# Patient Record
Sex: FEMALE | Race: WHITE | NOT HISPANIC OR LATINO | Employment: OTHER | URBAN - METROPOLITAN AREA
[De-identification: names, ages, dates, MRNs, and addresses within clinical notes are randomized per-mention and may not be internally consistent; named-entity substitution may affect disease eponyms.]

---

## 2022-09-26 ENCOUNTER — TRANSFERRED RECORDS (OUTPATIENT)
Dept: HEALTH INFORMATION MANAGEMENT | Facility: CLINIC | Age: 62
End: 2022-09-26

## 2022-10-06 ENCOUNTER — TRANSFERRED RECORDS (OUTPATIENT)
Dept: HEALTH INFORMATION MANAGEMENT | Facility: CLINIC | Age: 62
End: 2022-10-06

## 2023-03-28 ENCOUNTER — TRANSCRIBE ORDERS (OUTPATIENT)
Dept: OTHER | Age: 63
End: 2023-03-28

## 2023-03-28 DIAGNOSIS — R20.2 NUMBNESS AND TINGLING: Primary | ICD-10-CM

## 2023-03-28 DIAGNOSIS — R20.0 NUMBNESS AND TINGLING: Primary | ICD-10-CM

## 2023-03-28 DIAGNOSIS — G62.9 PERIPHERAL NEUROPATHY: ICD-10-CM

## 2023-07-13 ENCOUNTER — TELEPHONE (OUTPATIENT)
Dept: NEUROLOGY | Facility: CLINIC | Age: 63
End: 2023-07-13
Payer: COMMERCIAL

## 2023-07-13 NOTE — TELEPHONE ENCOUNTER
LMTC- need to reschedule as provider will not be in clinic- Offer 10-5-23 as the date is on hold for rescheduling.  Also inform patient of physician upcoming [facility]. They may want to schedule with a different provider.

## 2023-11-03 ENCOUNTER — OFFICE VISIT (OUTPATIENT)
Dept: NEUROLOGY | Facility: CLINIC | Age: 63
End: 2023-11-03
Attending: ORTHOPAEDIC SURGERY
Payer: COMMERCIAL

## 2023-11-03 VITALS
HEART RATE: 73 BPM | OXYGEN SATURATION: 99 % | WEIGHT: 130 LBS | SYSTOLIC BLOOD PRESSURE: 150 MMHG | DIASTOLIC BLOOD PRESSURE: 81 MMHG

## 2023-11-03 DIAGNOSIS — G57.02 LESION OF SCIATIC NERVE, LEFT: Primary | ICD-10-CM

## 2023-11-03 DIAGNOSIS — R20.2 NUMBNESS AND TINGLING: ICD-10-CM

## 2023-11-03 DIAGNOSIS — R20.0 NUMBNESS AND TINGLING: ICD-10-CM

## 2023-11-03 PROCEDURE — 99204 OFFICE O/P NEW MOD 45 MIN: CPT | Performed by: PSYCHIATRY & NEUROLOGY

## 2023-11-03 RX ORDER — METHIMAZOLE 5 MG/1
5 TABLET ORAL
COMMUNITY
Start: 2000-06-01

## 2023-11-03 NOTE — PROGRESS NOTES
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  I would not be surprised if she was not having local irritation of the posterior tibial nerve at the knee related to her degenerative changes and mechanical disadvantage however it is also possible that she does have some mechanical issues in the lumbar spine causing some irritability of the sciatic nerve as well, either of these could produce her symptoms.  Absent any significant abnormality on her exam however leads me to believe that this is more of an irritability than the damage circumstance and need not be approached for any aggressive interventions.  After lengthy discussion with her in the course of our visit, it is suggested she simply wait this out to see if her knee replacement surgery does any good.  She may in the interim however focus a little bit more on her lumbar spine mechanics as noted above.             History of Present Illness:   CC:    I met with Basilia today for 55 minutes to address some neuropathic symptoms she is experiencing in her left foot.  She is a generally healthy 63-year-old who has had \"prediabetes\" identified several years ago with an elevated hemoglobin A1c to 5.9 and slightly elevated fasting sugars.  Diet and exercise however have reversed that tendency such that her numbers are now well within the normal limits.  She also is followed for Graves' disease.    She has orthopedic " issues involving her knees.  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  She has constant left knee discomfort worse with standing and moving but never resolving completely.  She also, has had issues with her lower back at one time having significant back pain rating into the left buttock and thigh but this has not been a problem of more recent vintage.  She has not been aware of any prior neurological issues involving any neuropathic symptoms such as other forms of sciatica, any weakness numbness or tingling anywhere in the lower extremities, difficulty with gait, balance, coordination, or bowel or bladder function other than the mechanical issues provided by her knee discomfort.  She has had however some numbness and tingling in the bottom of the left foot for several months perhaps up to a year, has undergone EMG testing of the left leg (results not available to me) and reports that they were negative for neuropathic findings.  Whether her left leg discomfort has emerged from her back or her knee has been up for debate for several years however it is quite clear that she has rather substantial degenerative knee issues and that she will need this knee replaced shortly.    The balance of her neurologic review of systems is noncontributory.  She has no issues with headache, vertigo, diplopia, dysarthria, dysphagia, focal weakness, other areas of numbness, loss of consciousness, or seizures.    The balance of her health history is noted below.             Physical Exam:   Other than absent reflexes, her neurological examination is normal including review of her cranial nerves, motor, sensory, coordination station gait and reflexes.  She does have mechanical abnormalities in her lumbar spine with an absence of normal flattening of the lumbar lordotic curve more  noticeable on the left than on the right with flattening on the left and some rounding of the curvature of the right paraspinous muscles in the lumbar region.  Forward flexion at the waist however does not provoke any neuropathic symptoms nor can I provoke them with pressure over the SI joint or sciatic notch.  Her gait is rather remarkable for profound genu varum bilaterally.         Data:   All laboratory data reviewed  All imaging studies reviewed by me             DATA for DOCUMENTATION:         Past Medical History:   There is no problem list on file for this patient.    No past medical history on file.    Also see scanned health assessment forms.       Past Surgical History:   No past surgical history on file.         Social History:     Social History     Socioeconomic History    Marital status: Single     Spouse name: Not on file    Number of children: Not on file    Years of education: Not on file    Highest education level: Not on file   Occupational History    Not on file   Tobacco Use    Smoking status: Never    Smokeless tobacco: Never   Substance and Sexual Activity    Alcohol use: Yes     Comment: Moderate    Drug use: Never    Sexual activity: Not on file   Other Topics Concern    Not on file   Social History Narrative    Not on file     Social Determinants of Health     Financial Resource Strain: Not on file   Food Insecurity: Not on file   Transportation Needs: Not on file   Physical Activity: Not on file   Stress: Not on file   Social Connections: Not on file   Interpersonal Safety: Not on file   Housing Stability: Not on file              Family History:   No family history on file.         Medications:     Current Outpatient Medications   Medication Sig    methimazole (TAPAZOLE) 5 MG tablet Take 5 mg by mouth     No current facility-administered medications for this visit.              Review of Systems:   A comprehensive 10 point review of systems (constitutional, ENT, cardiac, peripheral  vascular, lymphatic, respiratory, GI, , Musculoskeletal, skin, Neurological) was performed and found to be negative except as described in this note.     See intake form completed by patient

## 2023-11-03 NOTE — NURSING NOTE
Debra Knight is a 63 year old female who presents for:  Chief Complaint   Patient presents with    Referral     Numbness and tingling  Peripheral neuropathy            Initial Vitals:  BP (!) 150/81   Pulse 73   Wt 59 kg (130 lb)   SpO2 99%  There is no height or weight on file to calculate BMI.. There is no height or weight on file to calculate BSA. BP completed using cuff size: linda Nina

## 2023-11-03 NOTE — LETTER
"    11/3/2023         RE: Debra Knight  [address]  [place] [facility] [address]        Dear Colleague,    Thank you for referring your patient, Debra Knight, to the [facility]. Please see a copy of my visit note below.        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  I would not be surprised if she was not having local irritation of the posterior tibial nerve at the knee related to her degenerative changes and mechanical disadvantage however it is also possible that she does have some mechanical issues in the lumbar spine causing some irritability of the sciatic nerve as well, either of these could produce her symptoms.  Absent any significant abnormality on her exam however leads me to believe that this is more of an irritability than the damage circumstance and need not be approached for any aggressive interventions.  After lengthy discussion with her in the course of our visit, it is suggested she simply wait this out to see if her knee replacement surgery does any good.  She may in the interim however focus a little bit more on her lumbar spine mechanics as noted above.             History of Present Illness:   CC:    I met with Basilia today for 55 minutes to address some neuropathic symptoms she is experiencing in her left foot.  She is a generally healthy 63-year-old who has had \"prediabetes\" identified several " years ago with an elevated hemoglobin A1c to 5.9 and slightly elevated fasting sugars.  Diet and exercise however have reversed that tendency such that her numbers are now well within the normal limits.  She also is followed for Graves' disease.    She has orthopedic issues involving her knees.  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  She has constant left knee discomfort worse with standing and moving but never resolving completely.  She also, has had issues with her lower back at one time having significant back pain rating into the left buttock and thigh but this has not been a problem of more recent vintage.  She has not been aware of any prior neurological issues involving any neuropathic symptoms such as other forms of sciatica, any weakness numbness or tingling anywhere in the lower extremities, difficulty with gait, balance, coordination, or bowel or bladder function other than the mechanical issues provided by her knee discomfort.  She has had however some numbness and tingling in the bottom of the left foot for several months perhaps up to a year, has undergone EMG testing of the left leg (results not available to me) and reports that they were negative for neuropathic findings.  Whether her left leg discomfort has emerged from her back or her knee has been up for debate for several years however it is quite clear that she has rather substantial degenerative knee issues and that she will need this knee replaced shortly.    The balance of her neurologic review of systems is noncontributory.  She has no issues with headache, vertigo, diplopia, dysarthria, dysphagia, focal weakness, other areas of numbness, loss of consciousness, or seizures.    The balance of her health history is noted below.             Physical Exam:   Other than absent reflexes,  her neurological examination is normal including review of her cranial nerves, motor, sensory, coordination station gait and reflexes.  She does have mechanical abnormalities in her lumbar spine with an absence of normal flattening of the lumbar lordotic curve more noticeable on the left than on the right with flattening on the left and some rounding of the curvature of the right paraspinous muscles in the lumbar region.  Forward flexion at the waist however does not provoke any neuropathic symptoms nor can I provoke them with pressure over the SI joint or sciatic notch.  Her gait is rather remarkable for profound genu varum bilaterally.         Data:   All laboratory data reviewed  All imaging studies reviewed by me             DATA for DOCUMENTATION:         Past Medical History:   There is no problem list on file for this patient.    No past medical history on file.    Also see scanned health assessment forms.       Past Surgical History:   No past surgical history on file.         Social History:     Social History     Socioeconomic History     Marital status: Single     Spouse name: Not on file     Number of children: Not on file     Years of education: Not on file     Highest education level: Not on file   Occupational History     Not on file   Tobacco Use     Smoking status: Never     Smokeless tobacco: Never   Substance and Sexual Activity     Alcohol use: Yes     Comment: Moderate     Drug use: Never     Sexual activity: Not on file   Other Topics Concern     Not on file   Social History Narrative     Not on file     Social Determinants of Health     Financial Resource Strain: Not on file   Food Insecurity: Not on file   Transportation Needs: Not on file   Physical Activity: Not on file   Stress: Not on file   Social Connections: Not on file   Interpersonal Safety: Not on file   Housing Stability: Not on file              Family History:   No family history on file.         Medications:     Current  Outpatient Medications   Medication Sig     methimazole (TAPAZOLE) 5 MG tablet Take 5 mg by mouth     No current facility-administered medications for this visit.              Review of Systems:   A comprehensive 10 point review of systems (constitutional, ENT, cardiac, peripheral vascular, lymphatic, respiratory, GI, , Musculoskeletal, skin, Neurological) was performed and found to be negative except as described in this note.     See intake form completed by patient    Again, thank you for allowing me to participate in the care of your patient.        Sincerely,        Emerson Thapa MD, MD

## 2023-11-03 NOTE — LETTER
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  I would not be surprised if she was not having local irritation of the posterior tibial nerve at the knee related to her degenerative changes and mechanical disadvantage however it is also possible that she does have some mechanical issues in the lumbar spine causing some irritability of the sciatic nerve as well, either of these could produce her symptoms.  Absent any significant abnormality on her exam however leads me to believe that this is more of an irritability than the damage circumstance and need not be approached for any aggressive interventions.  After lengthy discussion with her in the course of our visit, it is suggested she simply wait this out to see if her knee replacement surgery does any good.  She may in the interim however focus a little bit more on her lumbar spine mechanics as noted above.             History of Present Illness:   CC:    I met with Basilia today for 55 minutes to address some neuropathic symptoms she is experiencing in her left foot.  She is a generally healthy 63-year-old who has had \"prediabetes\" identified several years ago with an elevated hemoglobin A1c to 5.9 and slightly elevated fasting sugars.  Diet and exercise however have reversed that tendency such that her numbers are now well " within the normal limits.  She also is followed for Graves' disease.    She has orthopedic issues involving her knees.  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  She has constant left knee discomfort worse with standing and moving but never resolving completely.  She also, has had issues with her lower back at one time having significant back pain rating into the left buttock and thigh but this has not been a problem of more recent vintage.  She has not been aware of any prior neurological issues involving any neuropathic symptoms such as other forms of sciatica, any weakness numbness or tingling anywhere in the lower extremities, difficulty with gait, balance, coordination, or bowel or bladder function other than the mechanical issues provided by her knee discomfort.  She has had however some numbness and tingling in the bottom of the left foot for several months perhaps up to a year, has undergone EMG testing of the left leg (results not available to me) and reports that they were negative for neuropathic findings.  Whether her left leg discomfort has emerged from her back or her knee has been up for debate for several years however it is quite clear that she has rather substantial degenerative knee issues and that she will need this knee replaced shortly.    The balance of her neurologic review of systems is noncontributory.  She has no issues with headache, vertigo, diplopia, dysarthria, dysphagia, focal weakness, other areas of numbness, loss of consciousness, or seizures.    The balance of her health history is noted below.             Physical Exam:   Other than absent reflexes, her neurological examination is normal including review of her cranial nerves, motor, sensory, coordination station gait and reflexes.  She does have mechanical abnormalities in  her lumbar spine with an absence of normal flattening of the lumbar lordotic curve more noticeable on the left than on the right with flattening on the left and some rounding of the curvature of the right paraspinous muscles in the lumbar region.  Forward flexion at the waist however does not provoke any neuropathic symptoms nor can I provoke them with pressure over the SI joint or sciatic notch.  Her gait is rather remarkable for profound genu varum bilaterally.         Data:   All laboratory data reviewed  All imaging studies reviewed by me             DATA for DOCUMENTATION:         Past Medical History:   There is no problem list on file for this patient.    No past medical history on file.    Also see scanned health assessment forms.       Past Surgical History:   No past surgical history on file.         Social History:     Social History     Socioeconomic History     Marital status: Single     Spouse name: Not on file     Number of children: Not on file     Years of education: Not on file     Highest education level: Not on file   Occupational History     Not on file   Tobacco Use     Smoking status: Never     Smokeless tobacco: Never   Substance and Sexual Activity     Alcohol use: Yes     Comment: Moderate     Drug use: Never     Sexual activity: Not on file   Other Topics Concern     Not on file   Social History Narrative     Not on file     Social Determinants of Health     Financial Resource Strain: Not on file   Food Insecurity: Not on file   Transportation Needs: Not on file   Physical Activity: Not on file   Stress: Not on file   Social Connections: Not on file   Interpersonal Safety: Not on file   Housing Stability: Not on file              Family History:   No family history on file.         Medications:     Current Outpatient Medications   Medication Sig     methimazole (TAPAZOLE) 5 MG tablet Take 5 mg by mouth     No current facility-administered medications for this visit.              Review of  Systems:   A comprehensive 10 point review of systems (constitutional, ENT, cardiac, peripheral vascular, lymphatic, respiratory, GI, , Musculoskeletal, skin, Neurological) was performed and found to be negative except as described in this note.     See intake form completed by patient      Sincerely,        Emerson Thapa MD, MD